# Patient Record
Sex: FEMALE | Race: WHITE | NOT HISPANIC OR LATINO | Employment: FULL TIME | URBAN - METROPOLITAN AREA
[De-identification: names, ages, dates, MRNs, and addresses within clinical notes are randomized per-mention and may not be internally consistent; named-entity substitution may affect disease eponyms.]

---

## 2021-10-22 ENCOUNTER — LAB REQUISITION (OUTPATIENT)
Dept: LAB | Facility: CLINIC | Age: 26
End: 2021-10-22

## 2021-10-22 LAB — HBV SURFACE AB SERPL IA-ACNC: 1.14 M[IU]/ML

## 2021-10-22 PROCEDURE — 86787 VARICELLA-ZOSTER ANTIBODY: CPT | Performed by: INTERNAL MEDICINE

## 2021-10-22 PROCEDURE — 86481 TB AG RESPONSE T-CELL SUSP: CPT | Performed by: INTERNAL MEDICINE

## 2021-10-22 PROCEDURE — 86706 HEP B SURFACE ANTIBODY: CPT | Performed by: INTERNAL MEDICINE

## 2021-10-23 LAB
GAMMA INTERFERON BACKGROUND BLD IA-ACNC: 0.04 IU/ML
M TB IFN-G BLD-IMP: NEGATIVE
M TB IFN-G CD4+ BCKGRND COR BLD-ACNC: 9.96 IU/ML
MITOGEN IGNF BCKGRD COR BLD-ACNC: 0 IU/ML
MITOGEN IGNF BCKGRD COR BLD-ACNC: 0.01 IU/ML
QUANTIFERON MITOGEN: 10 IU/ML
QUANTIFERON NIL TUBE: 0.04 IU/ML
QUANTIFERON TB1 TUBE: 0.04 IU/ML
QUANTIFERON TB2 TUBE: 0.05

## 2021-10-25 LAB
VZV IGG SER QL IA: 1294 INDEX
VZV IGG SER QL IA: POSITIVE

## 2022-01-08 ENCOUNTER — APPOINTMENT (OUTPATIENT)
Dept: URGENT CARE | Facility: CLINIC | Age: 27
End: 2022-01-08
Payer: COMMERCIAL

## 2022-02-06 ENCOUNTER — HEALTH MAINTENANCE LETTER (OUTPATIENT)
Age: 27
End: 2022-02-06

## 2022-10-03 ENCOUNTER — HEALTH MAINTENANCE LETTER (OUTPATIENT)
Age: 27
End: 2022-10-03

## 2022-12-10 PROCEDURE — 96361 HYDRATE IV INFUSION ADD-ON: CPT

## 2022-12-10 PROCEDURE — 96374 THER/PROPH/DIAG INJ IV PUSH: CPT

## 2022-12-10 PROCEDURE — C9803 HOPD COVID-19 SPEC COLLECT: HCPCS

## 2022-12-10 PROCEDURE — 29505 APPLICATION LONG LEG SPLINT: CPT | Mod: RT

## 2022-12-10 PROCEDURE — 96375 TX/PRO/DX INJ NEW DRUG ADDON: CPT

## 2022-12-10 PROCEDURE — 99285 EMERGENCY DEPT VISIT HI MDM: CPT | Mod: 25

## 2022-12-11 ENCOUNTER — HOSPITAL ENCOUNTER (EMERGENCY)
Facility: CLINIC | Age: 27
Discharge: HOME | End: 2022-12-11
Attending: EMERGENCY MEDICINE | Admitting: INTERNAL MEDICINE
Payer: COMMERCIAL

## 2022-12-11 ENCOUNTER — APPOINTMENT (OUTPATIENT)
Dept: GENERAL RADIOLOGY | Facility: CLINIC | Age: 27
End: 2022-12-11
Attending: EMERGENCY MEDICINE
Payer: COMMERCIAL

## 2022-12-11 VITALS
BODY MASS INDEX: 51.91 KG/M2 | WEIGHT: 293 LBS | HEART RATE: 104 BPM | HEIGHT: 63 IN | DIASTOLIC BLOOD PRESSURE: 69 MMHG | OXYGEN SATURATION: 98 % | RESPIRATION RATE: 20 BRPM | TEMPERATURE: 98.2 F | SYSTOLIC BLOOD PRESSURE: 134 MMHG

## 2022-12-11 DIAGNOSIS — S82.001A CLOSED DISPLACED FRACTURE OF RIGHT PATELLA, UNSPECIFIED FRACTURE MORPHOLOGY, INITIAL ENCOUNTER: ICD-10-CM
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
## 2022-12-11 LAB
ABO/RH(D): NORMAL
ALBUMIN SERPL BCG-MCNC: 4.7 G/DL (ref 3.5–5.2)
ALP SERPL-CCNC: 73 U/L (ref 35–104)
ALT SERPL W P-5'-P-CCNC: 29 U/L (ref 10–35)
ANION GAP SERPL CALCULATED.3IONS-SCNC: 17 MMOL/L (ref 7–15)
ANTIBODY SCREEN: NEGATIVE
AST SERPL W P-5'-P-CCNC: 21 U/L (ref 10–35)
BASOPHILS # BLD AUTO: 0.1 10E3/UL (ref 0–0.2)
BASOPHILS NFR BLD AUTO: 1 %
BILIRUB SERPL-MCNC: 0.2 MG/DL
BUN SERPL-MCNC: 11.8 MG/DL (ref 6–20)
CALCIUM SERPL-MCNC: 9.7 MG/DL (ref 8.6–10)
CHLORIDE SERPL-SCNC: 101 MMOL/L (ref 98–107)
CREAT SERPL-MCNC: 0.71 MG/DL (ref 0.51–0.95)
DEPRECATED HCO3 PLAS-SCNC: 19 MMOL/L (ref 22–29)
EOSINOPHIL # BLD AUTO: 0.1 10E3/UL (ref 0–0.7)
EOSINOPHIL NFR BLD AUTO: 1 %
ERYTHROCYTE [DISTWIDTH] IN BLOOD BY AUTOMATED COUNT: 14.6 % (ref 10–15)
ETHANOL SERPL-MCNC: <0.01 G/DL
FLUAV RNA SPEC QL NAA+PROBE: NEGATIVE
FLUBV RNA RESP QL NAA+PROBE: NEGATIVE
GFR SERPL CREATININE-BSD FRML MDRD: >90 ML/MIN/1.73M2
GLUCOSE SERPL-MCNC: 109 MG/DL (ref 70–99)
HCT VFR BLD AUTO: 40.4 % (ref 35–47)
HGB BLD-MCNC: 12.8 G/DL (ref 11.7–15.7)
IMM GRANULOCYTES # BLD: 0.1 10E3/UL
IMM GRANULOCYTES NFR BLD: 1 %
LYMPHOCYTES # BLD AUTO: 2.6 10E3/UL (ref 0.8–5.3)
LYMPHOCYTES NFR BLD AUTO: 27 %
MCH RBC QN AUTO: 26 PG (ref 26.5–33)
MCHC RBC AUTO-ENTMCNC: 31.7 G/DL (ref 31.5–36.5)
MCV RBC AUTO: 82 FL (ref 78–100)
MONOCYTES # BLD AUTO: 0.7 10E3/UL (ref 0–1.3)
MONOCYTES NFR BLD AUTO: 7 %
NEUTROPHILS # BLD AUTO: 6.2 10E3/UL (ref 1.6–8.3)
NEUTROPHILS NFR BLD AUTO: 63 %
NRBC # BLD AUTO: 0 10E3/UL
NRBC BLD AUTO-RTO: 0 /100
PLATELET # BLD AUTO: 306 10E3/UL (ref 150–450)
POTASSIUM SERPL-SCNC: 3.8 MMOL/L (ref 3.4–5.3)
PROT SERPL-MCNC: 7.5 G/DL (ref 6.4–8.3)
RBC # BLD AUTO: 4.92 10E6/UL (ref 3.8–5.2)
RSV RNA SPEC NAA+PROBE: NEGATIVE
SARS-COV-2 RNA RESP QL NAA+PROBE: NEGATIVE
SODIUM SERPL-SCNC: 137 MMOL/L (ref 136–145)
SPECIMEN EXPIRATION DATE: NORMAL
WBC # BLD AUTO: 9.6 10E3/UL (ref 4–11)

## 2022-12-11 PROCEDURE — 250N000013 HC RX MED GY IP 250 OP 250 PS 637: Performed by: EMERGENCY MEDICINE

## 2022-12-11 PROCEDURE — 73560 X-RAY EXAM OF KNEE 1 OR 2: CPT | Mod: RT

## 2022-12-11 PROCEDURE — 86901 BLOOD TYPING SEROLOGIC RH(D): CPT | Performed by: EMERGENCY MEDICINE

## 2022-12-11 PROCEDURE — 82077 ASSAY SPEC XCP UR&BREATH IA: CPT | Performed by: EMERGENCY MEDICINE

## 2022-12-11 PROCEDURE — 86850 RBC ANTIBODY SCREEN: CPT | Performed by: EMERGENCY MEDICINE

## 2022-12-11 PROCEDURE — 250N000013 HC RX MED GY IP 250 OP 250 PS 637: Performed by: INTERNAL MEDICINE

## 2022-12-11 PROCEDURE — 85025 COMPLETE CBC W/AUTO DIFF WBC: CPT | Performed by: EMERGENCY MEDICINE

## 2022-12-11 PROCEDURE — 80053 COMPREHEN METABOLIC PANEL: CPT | Performed by: EMERGENCY MEDICINE

## 2022-12-11 PROCEDURE — 87637 SARSCOV2&INF A&B&RSV AMP PRB: CPT | Performed by: EMERGENCY MEDICINE

## 2022-12-11 PROCEDURE — 250N000011 HC RX IP 250 OP 636: Performed by: EMERGENCY MEDICINE

## 2022-12-11 PROCEDURE — 99234 HOSP IP/OBS SM DT SF/LOW 45: CPT | Performed by: INTERNAL MEDICINE

## 2022-12-11 PROCEDURE — 120N000001 HC R&B MED SURG/OB

## 2022-12-11 PROCEDURE — 258N000003 HC RX IP 258 OP 636: Performed by: EMERGENCY MEDICINE

## 2022-12-11 PROCEDURE — 999N000147 HC STATISTIC PT IP EVAL DEFER

## 2022-12-11 PROCEDURE — 36415 COLL VENOUS BLD VENIPUNCTURE: CPT | Performed by: EMERGENCY MEDICINE

## 2022-12-11 PROCEDURE — 84295 ASSAY OF SERUM SODIUM: CPT | Performed by: EMERGENCY MEDICINE

## 2022-12-11 RX ORDER — ACETAMINOPHEN 500 MG
1000 TABLET ORAL EVERY 6 HOURS PRN
Status: DISCONTINUED | OUTPATIENT
Start: 2022-12-11 | End: 2022-12-11 | Stop reason: HOSPADM

## 2022-12-11 RX ORDER — OXYCODONE HYDROCHLORIDE 5 MG/1
2.5-5 TABLET ORAL EVERY 4 HOURS PRN
Qty: 20 TABLET | Refills: 0 | Status: SHIPPED | OUTPATIENT
Start: 2022-12-11

## 2022-12-11 RX ORDER — ONDANSETRON 4 MG/1
4 TABLET, ORALLY DISINTEGRATING ORAL EVERY 6 HOURS PRN
Status: DISCONTINUED | OUTPATIENT
Start: 2022-12-11 | End: 2022-12-11 | Stop reason: HOSPADM

## 2022-12-11 RX ORDER — FEXOFENADINE HCL 180 MG/1
180 TABLET ORAL DAILY
COMMUNITY

## 2022-12-11 RX ORDER — KETOROLAC TROMETHAMINE 15 MG/ML
15 INJECTION, SOLUTION INTRAMUSCULAR; INTRAVENOUS ONCE
Status: COMPLETED | OUTPATIENT
Start: 2022-12-11 | End: 2022-12-11

## 2022-12-11 RX ORDER — AMOXICILLIN 250 MG
2 CAPSULE ORAL 2 TIMES DAILY PRN
Status: DISCONTINUED | OUTPATIENT
Start: 2022-12-11 | End: 2022-12-11 | Stop reason: HOSPADM

## 2022-12-11 RX ORDER — HYDROMORPHONE HYDROCHLORIDE 1 MG/ML
0.3 INJECTION, SOLUTION INTRAMUSCULAR; INTRAVENOUS; SUBCUTANEOUS ONCE
Status: COMPLETED | OUTPATIENT
Start: 2022-12-11 | End: 2022-12-11

## 2022-12-11 RX ORDER — HYDROCODONE BITARTRATE AND ACETAMINOPHEN 5; 325 MG/1; MG/1
1 TABLET ORAL ONCE
Status: COMPLETED | OUTPATIENT
Start: 2022-12-11 | End: 2022-12-11

## 2022-12-11 RX ORDER — SERTRALINE HYDROCHLORIDE 100 MG/1
200 TABLET, FILM COATED ORAL DAILY
COMMUNITY
Start: 2022-08-01

## 2022-12-11 RX ORDER — ONDANSETRON 2 MG/ML
4 INJECTION INTRAMUSCULAR; INTRAVENOUS EVERY 6 HOURS PRN
Status: DISCONTINUED | OUTPATIENT
Start: 2022-12-11 | End: 2022-12-11 | Stop reason: HOSPADM

## 2022-12-11 RX ORDER — BUPROPION HYDROCHLORIDE 150 MG/1
150 TABLET ORAL DAILY
COMMUNITY
Start: 2022-09-07

## 2022-12-11 RX ORDER — ONDANSETRON 2 MG/ML
4 INJECTION INTRAMUSCULAR; INTRAVENOUS ONCE
Status: COMPLETED | OUTPATIENT
Start: 2022-12-11 | End: 2022-12-11

## 2022-12-11 RX ORDER — AMOXICILLIN 250 MG
1 CAPSULE ORAL 2 TIMES DAILY PRN
Status: DISCONTINUED | OUTPATIENT
Start: 2022-12-11 | End: 2022-12-11 | Stop reason: HOSPADM

## 2022-12-11 RX ADMIN — KETOROLAC TROMETHAMINE 15 MG: 15 INJECTION, SOLUTION INTRAMUSCULAR; INTRAVENOUS at 02:59

## 2022-12-11 RX ADMIN — HYDROCODONE BITARTRATE AND ACETAMINOPHEN 1 TABLET: 5; 325 TABLET ORAL at 00:41

## 2022-12-11 RX ADMIN — SODIUM CHLORIDE 1000 ML: 9 INJECTION, SOLUTION INTRAVENOUS at 02:58

## 2022-12-11 RX ADMIN — ONDANSETRON 4 MG: 2 INJECTION INTRAMUSCULAR; INTRAVENOUS at 02:58

## 2022-12-11 RX ADMIN — ACETAMINOPHEN 1000 MG: 500 TABLET ORAL at 09:19

## 2022-12-11 RX ADMIN — OXYCODONE HYDROCHLORIDE 2.5 MG: 5 TABLET ORAL at 11:56

## 2022-12-11 RX ADMIN — HYDROMORPHONE HYDROCHLORIDE 0.3 MG: 1 INJECTION, SOLUTION INTRAMUSCULAR; INTRAVENOUS; SUBCUTANEOUS at 02:59

## 2022-12-11 RX ADMIN — OXYCODONE HYDROCHLORIDE 2.5 MG: 5 TABLET ORAL at 08:12

## 2022-12-11 ASSESSMENT — ACTIVITIES OF DAILY LIVING (ADL)
ADLS_ACUITY_SCORE: 35

## 2022-12-11 ASSESSMENT — ENCOUNTER SYMPTOMS
ARTHRALGIAS: 1
NUMBNESS: 0

## 2022-12-11 NOTE — PROGRESS NOTES
Pt able to walk down orozco and back to room with use of crutches and knee immobilizer. No weight on right lower extremity

## 2022-12-11 NOTE — H&P
[facility]    Hospitalist History and Physical    Name: Anitha Razo    MRN: [id number]  YOB: 1995    Age: 27 year old  Date of Admission:  12/11/2022  Date of Service (when I saw the patient): 12/11/22    Assessment & Plan   Anitha Razo is a 27 year old female with past medical history significant for depression anxiety, morbid obesity, recent ACL repair presented to the emergency room after a mechanical fall.  Imaging shows acute patella fracture.  Patient in significant pain unable to bear weight.  Knee immobilizer was placed in ED admitted for pain control and Ortho evaluation    Acute right Pattela fracture  -Knee immobilizer placed  -Pain management  -PT evaluation  -Ortho consult  -Admit to observation unit for pain control    Depression/Anxiety  -Resume prior to admission meds once reconciled    Elevated blood pressure without diagnosis of hypertension  -Likely pain related  -Pain control  -Continue to monitor    Obesity  -Complicates care    Miscellaneous: Please review and reorder meds once reconciled      DVT Prophylaxis: Pneumatic Compression Devices  Code Status: Full Code    Disposition: Admit for observation for 1 day    Primary Care Physician   No primary care provider on file.    Chief Complaint   Right knee pain unable to ambulate status post fall today    History is obtained from the patient    History of Present Illness   Anitha Razo is a 27 year old female with past medical history significant for depression anxiety, morbid obesity, recent ACL repair presented to the emergency room after a mechanical fall.  Patient slipped on ice and landed on her knee.  After the fall she was not able to bear weight or walk came to the emergency room.  Evaluation in the emergency room showed acute patella fracture.  Patient had an ACL repair on the same knee few months ago by [facility], pain 5 out of 10 sharp in nature, worse with any range of motion.  No other  injuries no numbness or weakness.  Able to move all of the joints.    More than 10 point review of systems was carried out was otherwise negative.    In the emergency room knee immobilizer was applied patient received pain medication with inadequate control is being admitted for further evaluation and treatment    Past Medical History    No past medical history on file.  Depression anxiety    Past Surgical History   No past surgical history on file.    Prior to Admission Medications   None     Allergies   No Known Allergies    Social History   Social History     Tobacco Use     Smoking status: Not on file     Smokeless tobacco: Not on file   Substance Use Topics     Alcohol use: Not on file     Social History     Social History Narrative     Not on file     Occasional use of alcohol.  Drinks about once a month or so.  Did have drinks earlier today about 6-7.  Vapes nicotine  Lives alone  Works at [employer]    Family History   I have reviewed this patient's family history and updated it with pertinent information if needed.   No family history on file.  Family history significant for heart disease grandparent  Leukemia: Grandpa      Review of Systems   A Comprehensive greater than 10 system review of systems was carried out.  Pertinent positives and negatives are noted above.  Otherwise negative for contributory information.    Physical Exam   Temp: 98.2  F (36.8  C)   BP: (!) 103/94 Pulse: 111   Resp: 20 SpO2: 94 %      Vital Signs with Ranges  Temp:  [98.2  F (36.8  C)] 98.2  F (36.8  C)  Pulse:  [111-138] 111  Resp:  [20-40] 20  BP: (103)/(94) 103/94  SpO2:  [94 %-100 %] 94 %  0 lbs 0 oz    GEN:  Alert, oriented x 3, appears comfortable, no overt distress  HEENT:  Normocephalic/atraumatic, no scleral icterus, no nasal discharge, mouth dry  CV:  Regular rate and rhythm, no murmur or JVD.  S1 + S2 noted, no S3 or S4.  LUNGS:  Clear to auscultation bilaterally without rales/rhonchi/wheezing/retractions.  Symmetric  chest rise on inhalation noted.  ABD:  Active bowel sounds, soft, non-tender/non-distended.  No rebound/guarding/rigidity.  EXT:  No edema.  Right knee   SKIN:  Dry to touch, no exanthems noted in the visualized areas.  NEURO:  Symmetric muscle strength,   No new focal deficits appreciated.    Data   Data reviewed today:  I personally reviewed the X-ray of the knee image(s) showing Patella fracture.    No results for input(s): PH, PHV, PO2, PO2V, SAT, PCO2, PCO2V, HCO3, HCO3V in the last 168 hours.  Recent Labs   Lab 12/11/22 0247   WBC 9.6   HGB 12.8   HCT 40.4   MCV 82        Recent Labs   Lab 12/11/22 0247      POTASSIUM 3.8   CHLORIDE 101   CO2 19*   ANIONGAP 17*   *   BUN 11.8   CR 0.71   GFRESTIMATED >90   ALE 9.7     7-Day Micro Results     No results found for the last 168 hours.        No results for input(s): NTBNPI, NTBNP in the last 168 hours.  No results for input(s): CKT in the last 168 hours.    Invalid input(s): CK, CK TOTAL  No results for input(s): SED, CRP in the last 168 hours.  Recent Labs   Lab 12/11/22 0247   *     Recent Labs   Lab 12/11/22 0247   HGB 12.8     Recent Labs   Lab 12/11/22 0247   AST 21   ALT 29   ALKPHOS 73   BILITOTAL 0.2     No results for input(s): INR in the last 168 hours.  No results for input(s): LACT in the last 168 hours.  No results for input(s): LIPASE in the last 168 hours.  No results for input(s): TSH in the last 168 hours.  No results for input(s): TROPONIN, TROPI, TROPR, TROPONINIS in the last 168 hours.    Invalid input(s): TROPT, TROP, TROPONINIES, TNIH  No results for input(s): COLOR, APPEARANCE, URINEGLC, URINEBILI, URINEKETONE, SG, UBLD, URINEPH, PROTEIN, UROBILINOGEN, NITRITE, LEUKEST, RBCU, WBCU in the last 168 hours.    Recent Results (from the past 24 hour(s))   XR Knee Right 1/2 Views    Narrative    EXAM: XR KNEE RIGHT 1/2 VIEWS  LOCATION: [facility]  DATE/TIME: 12/11/2022 12:43 AM    INDICATION:  Fall, knee pain, recent ACL surgery  COMPARISON: 11/8/2022      Impression    IMPRESSION: Transverse fracture across the mid patella with between 0.7 and 3 cm of distraction along the fracture line.   Previous ACL reconstruction. No significant joint effusion evident.

## 2022-12-11 NOTE — PLAN OF CARE
PT: Received orders for evaluation and treatment. Patient admitted following mechanical fall, found to have acute patella fracture.  Patient with history of a recent ACL repair, previously had been using a brace and crutches for ambulation.  Per ortho note on 12/11, patient is WBAT with brace and crutches.  Per nursing, patient amb hallway with crutches and brace donned (unable to tolerate weight bearing at right LE).  No inpatient physical therapy needs at this time.  Will complete order.

## 2022-12-11 NOTE — PHARMACY-ADMISSION MEDICATION HISTORY
Admission medication history interview status for this patient is complete. See [facility] admission navigator for allergy information, prior to admission medications and immunization status.     Medication history interview done, indicate source(s): Patient  Medication history resources (including written lists, pill bottles, clinic record): [facility] and [facility]  Pharmacy: [id number] - [place], [place] - [address]    Changes made to PTA medication list:  Added: All medications  Changed: n/a  Reported as Not Taking: n/a  Removed: n/a    Actions taken by pharmacist (provider contacted, etc):None     Additional medication history information:None    Medication reconciliation/reorder completed by provider prior to medication history?  N   (Y/N)     Prior to Admission medications    Medication Sig Last Dose Taking? Auth Provider Long Term End Date   buPROPion (WELLBUTRIN XL) 150 MG 24 hr tablet Take 150 mg by mouth daily 12/9/2022 Yes Unknown, Entered By History Yes    fexofenadine (ALLEGRA) 180 MG tablet Take 180 mg by mouth daily 12/9/2022 Yes Unknown, Entered By History     sertraline (ZOLOFT) 100 MG tablet Take 200 mg by mouth daily 12/9/2022 Yes Unknown, Entered By History Yes

## 2022-12-11 NOTE — TREATMENT PLAN
Orthopedic Treatment Plan Note:    This is a 27 year old female who recently underwent an ACL reconstruction with bone patellar tendon bone autograft.  She now has a displaced patella fracture.  It is unclear if this fracture would have occurred without her prior surgery where they harvested bone graft from the patella but I think this is likely a complication of the initial surgery.  She should be placed into a knee immobilizer or a hinged knee brace locked in extension  She can be weightbearing as tolerated with the brace on at all times but should have crutches for balance. She was able to maintain WBAT with a brace and crutches during her ACL surgery recovery and should be able to do that now. She should follow-up with her orthopedic surgeon who initially treated her for her ACL reconstruction as this requires someone with sports specialty or trauma training.

## 2022-12-11 NOTE — ED TRIAGE NOTES
Pt slipped on the ice and landed on her Rt knee.  Pt had ACL surgery on Rt knee 7 weeks ago.  Pt screaming, crying and hyperventilating in triage.  Pt was wearing a knee brace.

## 2022-12-11 NOTE — ED NOTES
[facility]  ED Nurse Handoff Report    Anitha Razo is a 27 year old female   ED Chief complaint: Fall, Knee Injury, and Alcohol Intoxication  . ED Diagnosis:   Final diagnoses:   Closed displaced fracture of right patella, unspecified fracture morphology, initial encounter   Fall, initial encounter     Allergies: No Known Allergies    Code Status: Full Code  Activity level - Baseline/Home:  Independent. Activity Level - Current:   Assist X 2. Lift room needed: No. Bariatric: No   Needed: No   Isolation: No. Infection: Not Applicable.     Vital Signs:   Vitals:    12/11/22 0251 12/11/22 0300 12/11/22 0306 12/11/22 0315   BP:       Pulse:  111     Resp:  20     Temp:       SpO2: 98% 97% 98% 94%       Cardiac Rhythm:  ,      Pain level:    Patient confused: No. Patient Falls Risk: Yes.   Elimination Status: Has voided   Patient Report - Initial Complaint: Fall. Focused Assessment:    Pt slipped on the ice and landed on her Rt knee.  Pt had ACL surgery on Rt knee 7 weeks ago.  Pt screaming, crying and hyperventilating in triage.  Pt was wearing a knee brace.       Tests Performed:   Labs Ordered and Resulted from Time of ED Arrival to Time of ED Departure   COMPREHENSIVE METABOLIC PANEL - Abnormal       Result Value    Sodium 137      Potassium 3.8      Chloride 101      Carbon Dioxide (CO2) 19 (*)     Anion Gap 17 (*)     Urea Nitrogen 11.8      Creatinine 0.71      Calcium 9.7      Glucose 109 (*)     Alkaline Phosphatase 73      AST 21      ALT 29      Protein Total 7.5      Albumin 4.7      Bilirubin Total 0.2      GFR Estimate >90     CBC WITH PLATELETS AND DIFFERENTIAL - Abnormal    WBC Count 9.6      RBC Count 4.92      Hemoglobin 12.8      Hematocrit 40.4      MCV 82      MCH 26.0 (*)     MCHC 31.7      RDW 14.6      Platelet Count 306      % Neutrophils 63      % Lymphocytes 27      % Monocytes 7      % Eosinophils 1      % Basophils 1      % Immature Granulocytes 1      NRBCs per 100  WBC 0      Absolute Neutrophils 6.2      Absolute Lymphocytes 2.6      Absolute Monocytes 0.7      Absolute Eosinophils 0.1      Absolute Basophils 0.1      Absolute Immature Granulocytes 0.1      Absolute NRBCs 0.0     ETHYL ALCOHOL LEVEL - Normal    Alcohol ethyl <0.01     INFLUENZA A/B & SARS-COV2 PCR MULTIPLEX   TYPE AND SCREEN, ADULT    ABO/RH(D) O POS      Antibody Screen Negative      SPECIMEN EXPIRATION DATE [id number]     ABO/RH TYPE AND SCREEN     XR Knee Right 1/2 Views   Final Result   IMPRESSION: Transverse fracture across the mid patella with between 0.7 and 3 cm of distraction along the fracture line.    Previous ACL reconstruction. No significant joint effusion evident.        . Abnormal Results: Patella fx.   Treatments provided: see MAR  Family Comments: pt has updated  OBS brochure/video discussed/provided to patient:  No  ED Medications:   Medications   0.9% sodium chloride BOLUS (1,000 mLs Intravenous New Bag 12/11/22 0258)   HYDROcodone-acetaminophen (NORCO) 5-325 MG per tablet 1 tablet (1 tablet Oral Given 12/11/22 0041)   ondansetron (ZOFRAN) injection 4 mg (4 mg Intravenous Given 12/11/22 0258)   ketorolac (TORADOL) injection 15 mg (15 mg Intravenous Given 12/11/22 0259)   HYDROmorphone (PF) (DILAUDID) injection 0.3 mg (0.3 mg Intravenous Given 12/11/22 0259)     Drips infusing:  No  For the majority of the shift, the patient's behavior Green. Interventions performed were none.    Sepsis treatment initiated: No     Patient tested for COVID 19 prior to admission: YES    ED Nurse Name/Phone Number: Janet Flores RN,   3:53 AM

## 2022-12-11 NOTE — ED PROVIDER NOTES
History   Chief Complaint:  Fall, Knee Injury, and Alcohol Intoxication     The history is provided by the patient.      Anitha Razo is a 27 year old female with history of rupture of anterior cruciate ligament of right knee and generalized anxiety disorder who presents with a fall on ice where she injured her right knee while wearing a brace. She notes she did not hit her head. Patient had ACL reconstruction surgery 7 weeks ago with [facility]. Patient was drinking alcohol tonight. Patient's friend believes she drank 2 shots and 3-4 drinks tonight. Patient denies numbness or tingling. Per LAN, last tetanus was in 2020. She denies any other complaints.     Review of Systems   Musculoskeletal: Positive for arthralgias (Right knee).   Neurological: Negative for numbness.   All other systems reviewed and are negative.      Allergies:  No Known Allergies    Medications:  Sertraline  Fexofenadine  Bupropion  Hydroxyzine  Ondansetron    Past Medical History:     Rupture of anterior cruciate ligament of right knee  PCOS (polycystic ovarian syndrome)  TONY (generalized anxiety disorder)  Hypertriglyceridemia  Migraine without aura and without status migrainosus, not intractable     Past Surgical History:    ACL Reconstruction Surgery     Social History:  PCP: No primary care provider on file.   Patient presents with best friend.  Patient arrived via car.     Physical Exam     Patient Vitals for the past 24 hrs:   BP Temp Pulse Resp SpO2   12/11/22 0415 -- -- -- -- 97 %   12/11/22 0400 -- -- -- -- 98 %   12/11/22 0345 -- -- -- -- 94 %   12/11/22 0330 -- -- -- -- 97 %   12/11/22 0315 -- -- -- -- 94 %   12/11/22 0306 -- -- -- -- 98 %   12/11/22 0300 -- -- 111 20 97 %   12/11/22 0251 -- -- -- -- 98 %   12/11/22 0245 -- -- -- -- 97 %   12/11/22 0236 -- -- -- -- 98 %   12/11/22 0230 -- -- -- -- 97 %   12/11/22 0229 -- -- -- -- 97 %   12/11/22 0221 -- -- -- -- 99 %   12/11/22 0008 (!) 103/94 98.2  F (36.8  C) (!) 138 (!) 40 100 %        Physical Exam  General: Alert. Appears uncomfortable  Head:  The scalp, face, and head appear normal without trauma  Eyes:  Sclera white; Pupils are equal and round  ENT:    External ears normal.  No hemotympanum.      External nares normal.  No septal hematoma.    Neck:  No midline tenderness or pain with full ROM.  CV:  Rate as above with regular rhythm   No murmur   2/2 radial and dorsal pedal pulses  Resp:  Breath sounds clear and equal bilaterally    Non-labored, no retractions or accessory muscle use  GI:  Abdomen soft, non-tender, non-distended    No rebound tenderness or guarding  MSK:  No midline tenderness or bony step-off    No deformity    Moves all extremities equally and symmetrically other than RLE; decreased ROM R. Knee flexion/extension 2/2 to pain; abrasion overlying R. Knee with surrounding ecchymosis; no R. Hip/ankle tenderness. No proximal fibular tenderness  Skin:  No rash or lesions noted.  Neuro:   No apparent deficit.    GCS: 15  Psych:  Normal affect.        Emergency Department Course     Imaging:  XR Knee Right 1/2 Views   Final Result   IMPRESSION: Transverse fracture across the mid patella with between 0.7 and 3 cm of distraction along the fracture line.    Previous ACL reconstruction. No significant joint effusion evident.        Report per radiology    Laboratory:  Labs Ordered and Resulted from Time of ED Arrival to Time of ED Departure   COMPREHENSIVE METABOLIC PANEL - Abnormal       Result Value    Sodium 137      Potassium 3.8      Chloride 101      Carbon Dioxide (CO2) 19 (*)     Anion Gap 17 (*)     Urea Nitrogen 11.8      Creatinine 0.71      Calcium 9.7      Glucose 109 (*)     Alkaline Phosphatase 73      AST 21      ALT 29      Protein Total 7.5      Albumin 4.7      Bilirubin Total 0.2      GFR Estimate >90     CBC WITH PLATELETS AND DIFFERENTIAL - Abnormal    WBC Count 9.6      RBC Count 4.92      Hemoglobin 12.8      Hematocrit 40.4      MCV 82      MCH 26.0 (*)      MCHC 31.7      RDW 14.6      Platelet Count 306      % Neutrophils 63      % Lymphocytes 27      % Monocytes 7      % Eosinophils 1      % Basophils 1      % Immature Granulocytes 1      NRBCs per 100 WBC 0      Absolute Neutrophils 6.2      Absolute Lymphocytes 2.6      Absolute Monocytes 0.7      Absolute Eosinophils 0.1      Absolute Basophils 0.1      Absolute Immature Granulocytes 0.1      Absolute NRBCs 0.0     ETHYL ALCOHOL LEVEL - Normal    Alcohol ethyl <0.01     INFLUENZA A/B & SARS-COV2 PCR MULTIPLEX   TYPE AND SCREEN, ADULT    ABO/RH(D) O POS      Antibody Screen Negative      SPECIMEN EXPIRATION DATE [id number]     ABO/RH TYPE AND SCREEN          Emergency Department Course:    Reviewed:  I reviewed nursing notes, vitals, past medical history, MIIC, and Care Everywhere    Assessments:  0139 I obtained history and examined the patient as noted above.     Consults:  Dr. Reed, hospitalist    Interventions:  0041 Hydrocodone-acetaminophen 5-325 mg per tablet 1 tablet PO      Disposition:  The patient was admitted to the hospital under the care of Dr. Reed    Impression & Plan     Medical Decision Making:    Patient is a 27-year-old female presenting status post a recent fall complaining predominantly of right knee pain.  She is status post a recent ACL surgery within the past 2 months.  She is neurovascularly intact.  Right knee x-ray obtained from triage given prolonged wait time.  Concern for fracture across the mid patella today.  She is noted to have an abrasion over her knee as well though no evidence to suggest open joint.  The remainder of her trauma exam is unremarkable.  She was placed in a knee immobilizer.  She is accepted by hospitalist for admission for orthopedic consultation.  Her pain was controlled during her time in the ED.    Diagnosis:    ICD-10-CM    1. Closed displaced fracture of right patella, unspecified fracture morphology, initial encounter  S82.001A       2. Fall,  initial encounter  W19.XXXA           Discharge Medications:  New Prescriptions    No medications on file       Scribe Disclosure:  I, Marianna Krishnan, am serving as a scribe at 1:20 AM on 12/11/2022 to document services personally performed by Kamla Hicks DO based on my observations and the provider's statements to me.          Kamla Hicks,   12/11/22 0422

## 2022-12-12 NOTE — DISCHARGE SUMMARY
Admit Date: 12/11/2022  Discharge Date: 12/11/2022    PRINCIPAL FINAL DIAGNOSES:    1.  Acute right patellar fracture due to mechanical fall.  2.  Recent right ACL surgical repair, 7 weeks prior to this admission.  3.  Depression.  4.  Anxiety.    PRINCIPAL PROCEDURES THIS ADMISSION:    1.  Orthopedics consultation.  2.  Right knee x-ray showing transverse fracture across the mid patella.  3.  Physical therapy consultation.    REASON FOR ADMISSION:  Please see dictated history and physical.  In brief, Ms. Razo is a 27-year-old female who had recent right ACL repair 7 weeks ago.  She presented to the hospital after a mechanical fall resulting in right knee pain.  She was found to have an acute patellar fracture.    HOSPITAL COURSE:    1.  Acute right patellar fracture:  Patient was admitted for pain management and Orthopedics consultation.  Pain was controlled with oral pain medication.  Orthopedics consulted and recommended that the patient be placed into a knee immobilizer / hinged knee brace locked in extension.  She can be weightbearing as tolerated with the brace on at all times, but should have crutches for balance.  They recommended that she should follow up with her orthopedic surgeon who initially treated her ACL reconstruction after discharge.  2.  The patient mobilized with physical therapy prior to discharge today.  3.  The patient will be discharged home with instructions follow up with her orthopedic surgeon on discharge.    DISCHARGE MEDICATIONS:    1.  Oxycodone 2.5-5 mg every 4 hours as needed for pain, 20 tablets given with no refills.  2.  Wellbutrin- mg daily.  3.  Allegra 180 mg daily.  4.  Zoloft 200 mg daily.    FOLLOWUP INSTRUCTIONS:    1.  Wear a knee immobilizer or hinged knee brace locked in extension.  She can be weightbearing as tolerated with the brace on at all times, but should have crutches for balance.    2.  Follow up with previous orthopedic surgeon regarding further  treatment of new patellar fracture.  Call to arrange this appointment.    I examined the patient on day of discharge.    Angelo Daniel MD        D: 2022   T: 2022   MT: [id number]    Name:     RUBEN GIFFORD  MRN:      [id number]        Account:      [id number]   :      1995           Service Date: 2022                                  Discharge Date: 2022     Document: [id number]

## 2022-12-21 ENCOUNTER — DOCUMENTATION ONLY (OUTPATIENT)
Dept: EMERGENCY MEDICINE | Facility: CLINIC | Age: 27
End: 2022-12-21
Payer: COMMERCIAL

## 2022-12-21 DIAGNOSIS — R26.89 IMPAIRED GAIT AND MOBILITY: Primary | ICD-10-CM

## 2022-12-21 DIAGNOSIS — Z53.9 ERRONEOUS ENCOUNTER--DISREGARD: ICD-10-CM

## 2022-12-21 DIAGNOSIS — S82.091A OTHER CLOSED FRACTURE OF RIGHT PATELLA, INITIAL ENCOUNTER: ICD-10-CM

## 2022-12-29 ENCOUNTER — DOCUMENTATION ONLY (OUTPATIENT)
Dept: EMERGENCY MEDICINE | Facility: CLINIC | Age: 27
End: 2022-12-29

## 2022-12-29 DIAGNOSIS — R26.89 IMPAIRED GAIT AND MOBILITY: ICD-10-CM

## 2022-12-29 DIAGNOSIS — S82.001A CLOSED NONDISPLACED FRACTURE OF RIGHT PATELLA, UNSPECIFIED FRACTURE MORPHOLOGY, INITIAL ENCOUNTER: Primary | ICD-10-CM

## 2023-02-11 ENCOUNTER — HEALTH MAINTENANCE LETTER (OUTPATIENT)
Age: 28
End: 2023-02-11

## 2024-03-09 ENCOUNTER — HEALTH MAINTENANCE LETTER (OUTPATIENT)
Age: 29
End: 2024-03-09

## 2025-03-16 ENCOUNTER — HEALTH MAINTENANCE LETTER (OUTPATIENT)
Age: 30
End: 2025-03-16